# Patient Record
Sex: FEMALE | Race: WHITE
[De-identification: names, ages, dates, MRNs, and addresses within clinical notes are randomized per-mention and may not be internally consistent; named-entity substitution may affect disease eponyms.]

---

## 2018-03-18 ENCOUNTER — HOSPITAL ENCOUNTER (INPATIENT)
Dept: HOSPITAL 35 - 2N | Age: 73
LOS: 2 days | Discharge: HOME | DRG: 246 | End: 2018-03-20
Attending: HOSPITALIST | Admitting: HOSPITALIST
Payer: COMMERCIAL

## 2018-03-18 VITALS — SYSTOLIC BLOOD PRESSURE: 150 MMHG | DIASTOLIC BLOOD PRESSURE: 62 MMHG

## 2018-03-18 VITALS — BODY MASS INDEX: 31.57 KG/M2 | WEIGHT: 189.5 LBS | HEIGHT: 65 IN

## 2018-03-18 VITALS — SYSTOLIC BLOOD PRESSURE: 142 MMHG | DIASTOLIC BLOOD PRESSURE: 53 MMHG

## 2018-03-18 VITALS — DIASTOLIC BLOOD PRESSURE: 53 MMHG | SYSTOLIC BLOOD PRESSURE: 142 MMHG

## 2018-03-18 VITALS — SYSTOLIC BLOOD PRESSURE: 142 MMHG | DIASTOLIC BLOOD PRESSURE: 54 MMHG

## 2018-03-18 VITALS — DIASTOLIC BLOOD PRESSURE: 66 MMHG | SYSTOLIC BLOOD PRESSURE: 161 MMHG

## 2018-03-18 DIAGNOSIS — Z28.21: ICD-10-CM

## 2018-03-18 DIAGNOSIS — I21.4: ICD-10-CM

## 2018-03-18 DIAGNOSIS — Z82.49: ICD-10-CM

## 2018-03-18 DIAGNOSIS — G47.00: ICD-10-CM

## 2018-03-18 DIAGNOSIS — I10: ICD-10-CM

## 2018-03-18 DIAGNOSIS — T82.855A: Primary | ICD-10-CM

## 2018-03-18 DIAGNOSIS — Z79.899: ICD-10-CM

## 2018-03-18 DIAGNOSIS — J44.9: ICD-10-CM

## 2018-03-18 DIAGNOSIS — Z96.649: ICD-10-CM

## 2018-03-18 DIAGNOSIS — Z79.82: ICD-10-CM

## 2018-03-18 DIAGNOSIS — Z91.041: ICD-10-CM

## 2018-03-18 DIAGNOSIS — Z95.5: ICD-10-CM

## 2018-03-18 DIAGNOSIS — I25.119: ICD-10-CM

## 2018-03-18 DIAGNOSIS — Z87.891: ICD-10-CM

## 2018-03-18 DIAGNOSIS — R09.89: ICD-10-CM

## 2018-03-18 DIAGNOSIS — R73.9: ICD-10-CM

## 2018-03-18 DIAGNOSIS — E78.5: ICD-10-CM

## 2018-03-18 LAB
ALBUMIN SERPL-MCNC: 3.4 G/DL (ref 3.4–5)
ALT SERPL-CCNC: 39 U/L (ref 30–65)
ANION GAP SERPL CALC-SCNC: 7 MMOL/L (ref 7–16)
AST SERPL-CCNC: 19 U/L (ref 15–37)
BILIRUB SERPL-MCNC: 0.3 MG/DL
BUN SERPL-MCNC: 12 MG/DL (ref 7–18)
CALCIUM SERPL-MCNC: 9 MG/DL (ref 8.5–10.1)
CHLORIDE SERPL-SCNC: 106 MMOL/L (ref 98–107)
CHOLEST SERPL-MCNC: 241 MG/DL (ref ?–200)
CO2 SERPL-SCNC: 29 MMOL/L (ref 21–32)
CREAT SERPL-MCNC: 0.6 MG/DL (ref 0.6–1)
ERYTHROCYTE [DISTWIDTH] IN BLOOD BY AUTOMATED COUNT: 14.6 % (ref 10.5–14.5)
GLUCOSE SERPL-MCNC: 122 MG/DL (ref 74–106)
HCT VFR BLD CALC: 35.9 % (ref 37–47)
HDLC SERPL-MCNC: 79 MG/DL (ref 40–?)
HGB BLD-MCNC: 12 GM/DL (ref 12–15)
LDLC SERPL-MCNC: 132 MG/DL (ref ?–100)
MCH RBC QN AUTO: 29.7 PG (ref 26–34)
MCHC RBC AUTO-ENTMCNC: 33.6 G/DL (ref 28–37)
MCV RBC: 88.5 FL (ref 80–100)
PLATELET # BLD: 212 THOU/UL (ref 150–400)
POTASSIUM SERPL-SCNC: 4 MMOL/L (ref 3.5–5.1)
PROT SERPL-MCNC: 6.6 G/DL (ref 6.4–8.2)
RBC # BLD AUTO: 4.05 MIL/UL (ref 4.2–5)
SODIUM SERPL-SCNC: 142 MMOL/L (ref 136–145)
TC:HDL: 3.1 RATIO
TRIGL SERPL-MCNC: 150 MG/DL (ref ?–150)
VLDLC SERPL CALC-MCNC: 30 MG/DL (ref ?–40)
WBC # BLD AUTO: 3.9 THOU/UL (ref 4–11)

## 2018-03-18 PROCEDURE — 10081 I&D PILONIDAL CYST COMP: CPT

## 2018-03-19 VITALS — DIASTOLIC BLOOD PRESSURE: 48 MMHG | SYSTOLIC BLOOD PRESSURE: 126 MMHG

## 2018-03-19 VITALS — SYSTOLIC BLOOD PRESSURE: 143 MMHG | DIASTOLIC BLOOD PRESSURE: 53 MMHG

## 2018-03-19 VITALS — SYSTOLIC BLOOD PRESSURE: 134 MMHG | DIASTOLIC BLOOD PRESSURE: 91 MMHG

## 2018-03-19 VITALS — DIASTOLIC BLOOD PRESSURE: 59 MMHG | SYSTOLIC BLOOD PRESSURE: 183 MMHG

## 2018-03-19 VITALS — DIASTOLIC BLOOD PRESSURE: 54 MMHG | SYSTOLIC BLOOD PRESSURE: 167 MMHG

## 2018-03-19 VITALS — DIASTOLIC BLOOD PRESSURE: 66 MMHG | SYSTOLIC BLOOD PRESSURE: 154 MMHG

## 2018-03-19 VITALS — DIASTOLIC BLOOD PRESSURE: 56 MMHG | SYSTOLIC BLOOD PRESSURE: 153 MMHG

## 2018-03-19 VITALS — SYSTOLIC BLOOD PRESSURE: 126 MMHG | DIASTOLIC BLOOD PRESSURE: 48 MMHG

## 2018-03-19 VITALS — SYSTOLIC BLOOD PRESSURE: 148 MMHG | DIASTOLIC BLOOD PRESSURE: 52 MMHG

## 2018-03-19 VITALS — DIASTOLIC BLOOD PRESSURE: 65 MMHG | SYSTOLIC BLOOD PRESSURE: 145 MMHG

## 2018-03-20 VITALS — SYSTOLIC BLOOD PRESSURE: 149 MMHG | DIASTOLIC BLOOD PRESSURE: 58 MMHG

## 2018-03-20 VITALS — DIASTOLIC BLOOD PRESSURE: 46 MMHG | SYSTOLIC BLOOD PRESSURE: 134 MMHG

## 2018-03-20 VITALS — SYSTOLIC BLOOD PRESSURE: 129 MMHG | DIASTOLIC BLOOD PRESSURE: 46 MMHG

## 2018-03-20 VITALS — DIASTOLIC BLOOD PRESSURE: 49 MMHG | SYSTOLIC BLOOD PRESSURE: 133 MMHG

## 2018-03-20 LAB
ANION GAP SERPL CALC-SCNC: 9 MMOL/L (ref 7–16)
BASOPHILS NFR BLD AUTO: 0.3 % (ref 0–2)
BUN SERPL-MCNC: 13 MG/DL (ref 7–18)
CALCIUM SERPL-MCNC: 9.3 MG/DL (ref 8.5–10.1)
CHLORIDE SERPL-SCNC: 107 MMOL/L (ref 98–107)
CO2 SERPL-SCNC: 25 MMOL/L (ref 21–32)
CREAT SERPL-MCNC: 0.7 MG/DL (ref 0.6–1)
EOSINOPHIL NFR BLD: 0.1 % (ref 0–3)
ERYTHROCYTE [DISTWIDTH] IN BLOOD BY AUTOMATED COUNT: 14.3 % (ref 10.5–14.5)
GLUCOSE SERPL-MCNC: 204 MG/DL (ref 74–106)
GRANULOCYTES NFR BLD MANUAL: 86.3 % (ref 36–66)
HCT VFR BLD CALC: 36.1 % (ref 37–47)
HGB BLD-MCNC: 12.3 GM/DL (ref 12–15)
LYMPHOCYTES NFR BLD AUTO: 11.9 % (ref 24–44)
MCH RBC QN AUTO: 30.2 PG (ref 26–34)
MCHC RBC AUTO-ENTMCNC: 34.1 % (ref 28–37)
MCV RBC: 88.5 FL (ref 80–100)
MONOCYTES NFR BLD: 1.4 % (ref 1–8)
PLATELET # BLD: 249 THOU/UL (ref 150–400)
POTASSIUM SERPL-SCNC: 4.3 MMOL/L (ref 3.5–5.1)
RBC # BLD AUTO: 4.08 MIL/UL (ref 4.2–5)
SODIUM SERPL-SCNC: 141 MMOL/L (ref 136–145)
WBC # BLD AUTO: 4.3 THOU/UL (ref 4–11)
WBC NRBC COR # BLD: 4.3 THOU/UL (ref 4–11)

## 2018-07-06 ENCOUNTER — HOSPITAL ENCOUNTER (OUTPATIENT)
Dept: HOSPITAL 61 - PCVCCLINIC | Age: 73
Discharge: HOME | End: 2018-07-06
Attending: INTERNAL MEDICINE
Payer: MEDICARE

## 2018-07-06 DIAGNOSIS — F17.211: ICD-10-CM

## 2018-07-06 DIAGNOSIS — E78.2: ICD-10-CM

## 2018-07-06 DIAGNOSIS — R06.02: ICD-10-CM

## 2018-07-06 DIAGNOSIS — J43.2: ICD-10-CM

## 2018-07-06 DIAGNOSIS — I10: ICD-10-CM

## 2018-07-06 DIAGNOSIS — I25.10: Primary | ICD-10-CM

## 2018-07-06 PROCEDURE — 93005 ELECTROCARDIOGRAM TRACING: CPT

## 2018-07-06 PROCEDURE — 80061 LIPID PANEL: CPT

## 2018-08-13 ENCOUNTER — HOSPITAL ENCOUNTER (OUTPATIENT)
Dept: HOSPITAL 61 - PCVCCLINIC | Age: 73
Discharge: HOME | End: 2018-08-13
Attending: INTERNAL MEDICINE
Payer: MEDICARE

## 2018-08-13 DIAGNOSIS — F17.211: ICD-10-CM

## 2018-08-13 DIAGNOSIS — E78.2: ICD-10-CM

## 2018-08-13 DIAGNOSIS — Z79.82: ICD-10-CM

## 2018-08-13 DIAGNOSIS — I10: ICD-10-CM

## 2018-08-13 DIAGNOSIS — J43.2: ICD-10-CM

## 2018-08-13 DIAGNOSIS — Z88.8: ICD-10-CM

## 2018-08-13 DIAGNOSIS — I25.10: Primary | ICD-10-CM

## 2018-08-13 PROCEDURE — G0463 HOSPITAL OUTPT CLINIC VISIT: HCPCS

## 2019-02-18 ENCOUNTER — HOSPITAL ENCOUNTER (OUTPATIENT)
Dept: HOSPITAL 61 - PCVCCLINIC | Age: 74
Discharge: HOME | End: 2019-02-18
Attending: INTERNAL MEDICINE
Payer: MEDICARE

## 2019-02-18 DIAGNOSIS — I25.10: Primary | ICD-10-CM

## 2019-02-18 DIAGNOSIS — J43.2: ICD-10-CM

## 2019-02-18 DIAGNOSIS — E78.5: ICD-10-CM

## 2019-02-18 DIAGNOSIS — Z79.899: ICD-10-CM

## 2019-02-18 DIAGNOSIS — Z79.82: ICD-10-CM

## 2019-02-18 DIAGNOSIS — R94.31: ICD-10-CM

## 2019-02-18 DIAGNOSIS — I10: ICD-10-CM

## 2019-02-18 DIAGNOSIS — F17.211: ICD-10-CM

## 2019-02-18 DIAGNOSIS — Z91.041: ICD-10-CM

## 2019-02-18 PROCEDURE — 93005 ELECTROCARDIOGRAM TRACING: CPT

## 2019-02-18 PROCEDURE — 36415 COLL VENOUS BLD VENIPUNCTURE: CPT

## 2019-02-18 PROCEDURE — 85610 PROTHROMBIN TIME: CPT

## 2019-02-18 PROCEDURE — G0463 HOSPITAL OUTPT CLINIC VISIT: HCPCS

## 2019-06-25 ENCOUNTER — HOSPITAL ENCOUNTER (OUTPATIENT)
Dept: HOSPITAL 61 - PCVCCLINIC | Age: 74
Discharge: HOME | End: 2019-06-25
Attending: INTERNAL MEDICINE
Payer: MEDICARE

## 2019-06-25 DIAGNOSIS — F17.211: ICD-10-CM

## 2019-06-25 DIAGNOSIS — I25.10: Primary | ICD-10-CM

## 2019-06-25 DIAGNOSIS — E78.5: ICD-10-CM

## 2019-06-25 DIAGNOSIS — Z79.82: ICD-10-CM

## 2019-06-25 DIAGNOSIS — R06.02: ICD-10-CM

## 2019-06-25 DIAGNOSIS — I10: ICD-10-CM

## 2019-06-25 PROCEDURE — 36415 COLL VENOUS BLD VENIPUNCTURE: CPT

## 2019-06-25 PROCEDURE — G0463 HOSPITAL OUTPT CLINIC VISIT: HCPCS

## 2019-06-25 PROCEDURE — 93005 ELECTROCARDIOGRAM TRACING: CPT

## 2019-07-02 ENCOUNTER — HOSPITAL ENCOUNTER (OUTPATIENT)
Dept: HOSPITAL 35 - CATH | Age: 74
Setting detail: OBSERVATION
LOS: 1 days | Discharge: HOME | End: 2019-07-03
Attending: INTERNAL MEDICINE | Admitting: INTERNAL MEDICINE
Payer: COMMERCIAL

## 2019-07-02 VITALS — BODY MASS INDEX: 34.82 KG/M2 | HEIGHT: 65 IN | WEIGHT: 209 LBS

## 2019-07-02 VITALS — DIASTOLIC BLOOD PRESSURE: 43 MMHG | SYSTOLIC BLOOD PRESSURE: 132 MMHG

## 2019-07-02 VITALS — SYSTOLIC BLOOD PRESSURE: 177 MMHG | DIASTOLIC BLOOD PRESSURE: 59 MMHG

## 2019-07-02 VITALS — DIASTOLIC BLOOD PRESSURE: 47 MMHG | SYSTOLIC BLOOD PRESSURE: 157 MMHG

## 2019-07-02 DIAGNOSIS — J44.9: ICD-10-CM

## 2019-07-02 DIAGNOSIS — I10: ICD-10-CM

## 2019-07-02 DIAGNOSIS — R07.89: ICD-10-CM

## 2019-07-02 DIAGNOSIS — F17.200: ICD-10-CM

## 2019-07-02 DIAGNOSIS — I25.110: Primary | ICD-10-CM

## 2019-07-02 DIAGNOSIS — E78.5: ICD-10-CM

## 2019-07-02 DIAGNOSIS — E11.9: ICD-10-CM

## 2019-07-02 NOTE — NUR
PT TO THE UNIT POST CATH. STENT PLACED TO RCA.  GROING SITE ESTABLE - PT WITH
HTN GIVEN LISINOPRIL WITH LITTLE EFFECT ON BP - DR LILLY CALLED AND PATIENT
GIVEN NORVASC WITH BP SLOWLY STARTING TO IMPROVE.  PT UP TO THE BATHROOM -
BEDREST COMPLETE.  KAYLA DIET AND FLUIDS.  GIVEN HYDROCODONE FOR CO'S OF BACK
PAIN FROM PREVIOUS SURGERY COMPLETED WITH MOD EFFECT ON PAIN.  ASSESSMENTS AS
CHARTED -  IV FLUIDS COMPLETD. NO CO'S AT THE PRESENT TIME.

## 2019-07-03 VITALS — DIASTOLIC BLOOD PRESSURE: 52 MMHG | SYSTOLIC BLOOD PRESSURE: 154 MMHG

## 2019-07-03 VITALS — SYSTOLIC BLOOD PRESSURE: 131 MMHG | DIASTOLIC BLOOD PRESSURE: 47 MMHG

## 2019-07-03 LAB
ALBUMIN SERPL-MCNC: 3.4 G/DL (ref 3.4–5)
ALT SERPL-CCNC: 55 U/L (ref 30–65)
ANION GAP SERPL CALC-SCNC: 8 MMOL/L (ref 7–16)
AST SERPL-CCNC: 21 U/L (ref 15–37)
BILIRUB SERPL-MCNC: 0.2 MG/DL
BUN SERPL-MCNC: 17 MG/DL (ref 7–18)
CALCIUM SERPL-MCNC: 8.6 MG/DL (ref 8.5–10.1)
CHLORIDE SERPL-SCNC: 107 MMOL/L (ref 98–107)
CO2 SERPL-SCNC: 28 MMOL/L (ref 21–32)
CREAT SERPL-MCNC: 0.8 MG/DL (ref 0.6–1)
ERYTHROCYTE [DISTWIDTH] IN BLOOD BY AUTOMATED COUNT: 13.6 % (ref 10.5–14.5)
GLUCOSE SERPL-MCNC: 182 MG/DL (ref 74–106)
HCT VFR BLD CALC: 36.3 % (ref 37–47)
HGB BLD-MCNC: 12 GM/DL (ref 12–15)
MCH RBC QN AUTO: 29 PG (ref 26–34)
MCHC RBC AUTO-ENTMCNC: 33 G/DL (ref 28–37)
MCV RBC: 87.8 FL (ref 80–100)
PLATELET # BLD: 202 THOU/UL (ref 150–400)
POTASSIUM SERPL-SCNC: 4.5 MMOL/L (ref 3.5–5.1)
PROT SERPL-MCNC: 6.4 G/DL (ref 6.4–8.2)
RBC # BLD AUTO: 4.14 MIL/UL (ref 4.2–5)
SODIUM SERPL-SCNC: 143 MMOL/L (ref 136–145)
TROPONIN I SERPL-MCNC: <0.06 NG/ML (ref ?–0.06)
WBC # BLD AUTO: 5.5 THOU/UL (ref 4–11)

## 2019-07-03 NOTE — NUR
ASSUMED PT CARE AT 1900 WITH NO SIGN OF DISTRESS NOTED IN PT. PT IS ALERT AND
ORIENTED AND DENIES ANY NEED. ASSESSMENT COMPLETED AND CHARTED. DENIES ANY
NEED. RIGHT GROIN SITE IS INTACT. NO HEMATOMA OR BLEEDING, SCHEDULED MEDS
ADMINISTERED TO PT. DENIES ANY FURTHER NEEDS AT THIS TIME.

## 2019-07-03 NOTE — NUR
ASESSMENT AS CHARTED - MEDS AS PER MAR-  NO CO'S OF PAIN ORNAUSEA.  KAYLA DIET
AND FLUIDS. SEEN BY [facility] THIS AM PRIOR TO DISCHARGE.  IV AND MONITOR
REMOVED PRIOR TO D/C.  PT UP AD JASMEET IN ROOM, STEADY ON FEET.  PT DISCHARGED
THIS AM - INSTRUCTION RE HOME MEDS/ CARE AND FOLLOW UP GIVEN - STATED
UNDERSTANDING.  LEFT UNIT VIA WHEELCHAIR - HOME VIA PVT VEHICLE ACCOMPANIED BY
FRIEND.  NO CO'S AT TIME OF D/C.

## 2020-02-19 ENCOUNTER — HOSPITAL ENCOUNTER (OUTPATIENT)
Dept: HOSPITAL 35 - SJCVC | Age: 75
End: 2020-02-19
Attending: INTERNAL MEDICINE
Payer: COMMERCIAL

## 2020-02-19 DIAGNOSIS — E78.5: ICD-10-CM

## 2020-02-19 DIAGNOSIS — J43.2: ICD-10-CM

## 2020-02-19 DIAGNOSIS — Z79.899: ICD-10-CM

## 2020-02-19 DIAGNOSIS — F17.211: ICD-10-CM

## 2020-02-19 DIAGNOSIS — I11.0: ICD-10-CM

## 2020-02-19 DIAGNOSIS — I50.32: ICD-10-CM

## 2020-02-19 DIAGNOSIS — Z79.82: ICD-10-CM

## 2020-02-19 DIAGNOSIS — R00.1: Primary | ICD-10-CM

## 2020-02-19 DIAGNOSIS — I25.10: ICD-10-CM

## 2020-08-19 ENCOUNTER — HOSPITAL ENCOUNTER (OUTPATIENT)
Dept: HOSPITAL 35 - SJCVC | Age: 75
End: 2020-08-19
Attending: INTERNAL MEDICINE
Payer: COMMERCIAL

## 2020-08-19 DIAGNOSIS — E78.5: ICD-10-CM

## 2020-08-19 DIAGNOSIS — R94.31: ICD-10-CM

## 2020-08-19 DIAGNOSIS — I25.10: Primary | ICD-10-CM

## 2020-08-19 DIAGNOSIS — Z79.899: ICD-10-CM

## 2020-08-19 DIAGNOSIS — J43.2: ICD-10-CM

## 2020-08-19 DIAGNOSIS — F17.211: ICD-10-CM

## 2020-08-19 DIAGNOSIS — I50.32: ICD-10-CM

## 2020-08-19 DIAGNOSIS — I11.0: ICD-10-CM

## 2020-09-15 ENCOUNTER — HOSPITAL ENCOUNTER (OUTPATIENT)
Dept: HOSPITAL 35 - SJCVCIMAG | Age: 75
End: 2020-09-15
Attending: INTERNAL MEDICINE
Payer: COMMERCIAL

## 2020-09-15 DIAGNOSIS — Z87.891: ICD-10-CM

## 2020-09-15 DIAGNOSIS — I25.10: Primary | ICD-10-CM

## 2020-09-15 DIAGNOSIS — I10: ICD-10-CM

## 2020-09-15 DIAGNOSIS — J44.9: ICD-10-CM

## 2020-09-15 DIAGNOSIS — Z79.899: ICD-10-CM

## 2021-02-22 ENCOUNTER — HOSPITAL ENCOUNTER (OUTPATIENT)
Dept: HOSPITAL 35 - SJCVC | Age: 76
End: 2021-02-22
Attending: INTERNAL MEDICINE
Payer: COMMERCIAL

## 2021-02-22 DIAGNOSIS — F17.211: ICD-10-CM

## 2021-02-22 DIAGNOSIS — Z98.890: ICD-10-CM

## 2021-02-22 DIAGNOSIS — Z98.61: ICD-10-CM

## 2021-02-22 DIAGNOSIS — Z82.49: ICD-10-CM

## 2021-02-22 DIAGNOSIS — Z79.899: ICD-10-CM

## 2021-02-22 DIAGNOSIS — Z88.8: ICD-10-CM

## 2021-02-22 DIAGNOSIS — J43.2: ICD-10-CM

## 2021-02-22 DIAGNOSIS — Z79.82: ICD-10-CM

## 2021-02-22 DIAGNOSIS — I25.10: ICD-10-CM

## 2021-02-22 DIAGNOSIS — E78.5: ICD-10-CM

## 2021-02-22 DIAGNOSIS — I11.0: ICD-10-CM

## 2021-02-22 DIAGNOSIS — R00.1: Primary | ICD-10-CM

## 2021-02-22 DIAGNOSIS — I50.32: ICD-10-CM

## 2021-03-01 ENCOUNTER — HOSPITAL ENCOUNTER (OUTPATIENT)
Dept: HOSPITAL 35 - CAT | Age: 76
End: 2021-03-01
Attending: INTERNAL MEDICINE
Payer: COMMERCIAL

## 2021-03-01 DIAGNOSIS — I70.0: ICD-10-CM

## 2021-03-01 DIAGNOSIS — J84.10: ICD-10-CM

## 2021-03-01 DIAGNOSIS — J43.2: ICD-10-CM

## 2021-03-01 DIAGNOSIS — J98.4: ICD-10-CM

## 2021-03-01 DIAGNOSIS — Z87.891: ICD-10-CM

## 2021-03-01 DIAGNOSIS — Z12.2: Primary | ICD-10-CM

## 2021-03-01 DIAGNOSIS — I51.7: ICD-10-CM

## 2021-08-30 ENCOUNTER — HOSPITAL ENCOUNTER (OUTPATIENT)
Dept: HOSPITAL 35 - SJCVC | Age: 76
End: 2021-08-30
Attending: INTERNAL MEDICINE
Payer: COMMERCIAL

## 2021-08-30 DIAGNOSIS — I11.0: ICD-10-CM

## 2021-08-30 DIAGNOSIS — I25.10: ICD-10-CM

## 2021-08-30 DIAGNOSIS — J43.2: ICD-10-CM

## 2021-08-30 DIAGNOSIS — R00.1: Primary | ICD-10-CM

## 2021-08-30 DIAGNOSIS — I50.32: ICD-10-CM

## 2021-08-30 DIAGNOSIS — J44.9: ICD-10-CM

## 2021-08-30 DIAGNOSIS — Z79.899: ICD-10-CM

## 2021-08-30 DIAGNOSIS — F17.211: ICD-10-CM

## 2021-08-30 DIAGNOSIS — Z87.891: ICD-10-CM

## 2021-08-30 DIAGNOSIS — Z88.1: ICD-10-CM

## 2021-08-30 DIAGNOSIS — E78.5: ICD-10-CM

## 2021-08-30 DIAGNOSIS — Z79.82: ICD-10-CM

## 2022-02-21 ENCOUNTER — HOSPITAL ENCOUNTER (OUTPATIENT)
Dept: HOSPITAL 35 - RAD | Age: 77
End: 2022-02-21
Attending: NURSE PRACTITIONER
Payer: COMMERCIAL

## 2022-02-21 ENCOUNTER — HOSPITAL ENCOUNTER (OUTPATIENT)
Dept: HOSPITAL 35 - SJCVCIMAG | Age: 77
End: 2022-02-21
Attending: INTERNAL MEDICINE
Payer: COMMERCIAL

## 2022-02-21 DIAGNOSIS — F17.211: ICD-10-CM

## 2022-02-21 DIAGNOSIS — Z79.899: ICD-10-CM

## 2022-02-21 DIAGNOSIS — Z87.891: ICD-10-CM

## 2022-02-21 DIAGNOSIS — I25.10: ICD-10-CM

## 2022-02-21 DIAGNOSIS — Z86.16: ICD-10-CM

## 2022-02-21 DIAGNOSIS — Z82.49: ICD-10-CM

## 2022-02-21 DIAGNOSIS — I11.0: ICD-10-CM

## 2022-02-21 DIAGNOSIS — J43.2: ICD-10-CM

## 2022-02-21 DIAGNOSIS — I50.32: ICD-10-CM

## 2022-02-21 DIAGNOSIS — J44.9: Primary | ICD-10-CM

## 2022-02-21 DIAGNOSIS — E78.5: ICD-10-CM

## 2022-02-21 DIAGNOSIS — Z79.84: ICD-10-CM

## 2022-02-21 DIAGNOSIS — I08.0: Primary | ICD-10-CM

## 2022-02-21 DIAGNOSIS — Z88.8: ICD-10-CM
